# Patient Record
Sex: FEMALE | Race: WHITE | URBAN - METROPOLITAN AREA
[De-identification: names, ages, dates, MRNs, and addresses within clinical notes are randomized per-mention and may not be internally consistent; named-entity substitution may affect disease eponyms.]

---

## 2023-05-22 ENCOUNTER — OFFICE VISIT (OUTPATIENT)
Dept: URBAN - METROPOLITAN AREA CLINIC 63 | Facility: CLINIC | Age: 78
End: 2023-05-22
Payer: COMMERCIAL

## 2023-05-22 DIAGNOSIS — H16.223 KERATOCONJUNCTIVITIS SICCA, NOT SPECIFIED AS SJÖGREN'S, BILATERAL: ICD-10-CM

## 2023-05-22 DIAGNOSIS — H04.123 DRY EYE SYNDROME: ICD-10-CM

## 2023-05-22 DIAGNOSIS — H18.613 KERATOCONUS - BILATERAL: Primary | ICD-10-CM

## 2023-05-22 PROCEDURE — 99203 OFFICE O/P NEW LOW 30 MIN: CPT | Performed by: OPTOMETRIST

## 2023-05-22 ASSESSMENT — VISUAL ACUITY
OD: 20/80
OS: 20/70

## 2023-05-22 NOTE — IMPRESSION/PLAN
Impression: Keratoconus - Bilateral: H18.613. Plan: Discussed options for improved comfort - piggy backed lenses, improved dry eye management, scleral lenses(most recommended), RTC for 1 month evaluation.

## 2023-05-22 NOTE — IMPRESSION/PLAN
Impression: Dry Eye Syndrome: O17.410. Plan: Dry eyes account for the patient's complaints. There is no evidence of permanent changes to the cornea. Explained condition does not have a cure and will need artificial tears for maintenance.

## 2023-06-19 ENCOUNTER — OFFICE VISIT (OUTPATIENT)
Dept: URBAN - METROPOLITAN AREA CLINIC 63 | Facility: CLINIC | Age: 78
End: 2023-06-19
Payer: COMMERCIAL

## 2023-06-19 DIAGNOSIS — H18.613 KERATOCONUS - BILATERAL: ICD-10-CM

## 2023-06-19 DIAGNOSIS — H52.4 PRESBYOPIA: Primary | ICD-10-CM

## 2023-06-19 PROCEDURE — 92310 CONTACT LENS FITTING OU: CPT | Performed by: OPTOMETRIST

## 2023-06-19 PROCEDURE — 99213 OFFICE O/P EST LOW 20 MIN: CPT | Performed by: OPTOMETRIST

## 2023-06-19 ASSESSMENT — VISUAL ACUITY
OD: 20/25
OS: 20/30

## 2023-06-19 NOTE — IMPRESSION/PLAN
Impression: Keratoconus - Bilateral: H18.613. Plan: Discussed options for improved comfort  Discussed diagnosis with patient. Discussed treatment options. Advised patient to use artificial tears PRN OU. Scleral lenses are medically necessary to achieve BCVA. Patient agrees with plan. Start new fit and order lenses.  Aleksandr, Will do visits in [facility]

## 2023-07-17 ENCOUNTER — OFFICE VISIT (OUTPATIENT)
Dept: URBAN - METROPOLITAN AREA CLINIC 63 | Facility: CLINIC | Age: 78
End: 2023-07-17
Payer: COMMERCIAL

## 2023-07-17 DIAGNOSIS — H18.613 KERATOCONUS - BILATERAL: Primary | ICD-10-CM

## 2023-07-17 DIAGNOSIS — H52.4 PRESBYOPIA: ICD-10-CM

## 2023-07-17 PROCEDURE — 92310 CONTACT LENS FITTING OU: CPT | Performed by: OPTOMETRIST

## 2023-07-17 NOTE — IMPRESSION/PLAN
Impression: Keratoconus - Bilateral: H18.613. Plan: Discussed options for improved comfort  Discussed diagnosis with patient. Discussed treatment options. Advised patient to use artificial tears PRN OU. Scleral lenses are medically necessary to achieve BCVA. Patient agrees with plan.  order new lenses based off previous records or karin and refraction